# Patient Record
Sex: MALE | Race: WHITE | NOT HISPANIC OR LATINO | Employment: OTHER | URBAN - METROPOLITAN AREA
[De-identification: names, ages, dates, MRNs, and addresses within clinical notes are randomized per-mention and may not be internally consistent; named-entity substitution may affect disease eponyms.]

---

## 2024-10-22 DIAGNOSIS — C92.10 CML (CHRONIC MYELOCYTIC LEUKEMIA): Primary | ICD-10-CM

## 2024-10-31 PROBLEM — C92.11 CHRONIC MYELOID LEUKEMIA, BCR/ABL-POSITIVE, IN REMISSION: Status: ACTIVE | Noted: 2024-10-31

## 2024-11-04 ENCOUNTER — OFFICE VISIT (OUTPATIENT)
Dept: HEMATOLOGY/ONCOLOGY | Facility: CLINIC | Age: 64
End: 2024-11-04
Payer: MEDICARE

## 2024-11-04 VITALS
DIASTOLIC BLOOD PRESSURE: 78 MMHG | TEMPERATURE: 98 F | BODY MASS INDEX: 37.28 KG/M2 | SYSTOLIC BLOOD PRESSURE: 165 MMHG | OXYGEN SATURATION: 99 % | RESPIRATION RATE: 20 BRPM | WEIGHT: 281.31 LBS | HEART RATE: 54 BPM | HEIGHT: 73 IN

## 2024-11-04 DIAGNOSIS — C92.11 CHRONIC MYELOID LEUKEMIA, BCR/ABL-POSITIVE, IN REMISSION: Primary | ICD-10-CM

## 2024-11-04 LAB
ALBUMIN SERPL-MCNC: 3.8 G/DL (ref 3.4–4.8)
ALBUMIN/GLOB SERPL: 1.5 RATIO (ref 1.1–2)
ALP SERPL-CCNC: 52 UNIT/L (ref 40–150)
ALT SERPL-CCNC: 38 UNIT/L (ref 0–55)
ANION GAP SERPL CALC-SCNC: 9 MEQ/L
AST SERPL-CCNC: 42 UNIT/L (ref 5–34)
BASOPHILS # BLD AUTO: 0.03 X10(3)/MCL
BASOPHILS NFR BLD AUTO: 0.4 %
BILIRUB SERPL-MCNC: 1 MG/DL
BUN SERPL-MCNC: 25.2 MG/DL (ref 8.4–25.7)
CALCIUM SERPL-MCNC: 9.2 MG/DL (ref 8.8–10)
CHLORIDE SERPL-SCNC: 105 MMOL/L (ref 98–107)
CO2 SERPL-SCNC: 26 MMOL/L (ref 23–31)
CREAT SERPL-MCNC: 1.51 MG/DL (ref 0.72–1.25)
CREAT/UREA NIT SERPL: 17
EOSINOPHIL # BLD AUTO: 0.07 X10(3)/MCL (ref 0–0.9)
EOSINOPHIL NFR BLD AUTO: 1 %
ERYTHROCYTE [DISTWIDTH] IN BLOOD BY AUTOMATED COUNT: 13.6 % (ref 11.5–17)
GFR SERPLBLD CREATININE-BSD FMLA CKD-EPI: 51 ML/MIN/1.73/M2
GLOBULIN SER-MCNC: 2.6 GM/DL (ref 2.4–3.5)
GLUCOSE SERPL-MCNC: 88 MG/DL (ref 82–115)
HCT VFR BLD AUTO: 37.9 % (ref 42–52)
HGB BLD-MCNC: 12.8 G/DL (ref 14–18)
IMM GRANULOCYTES # BLD AUTO: 0.04 X10(3)/MCL (ref 0–0.04)
IMM GRANULOCYTES NFR BLD AUTO: 0.6 %
LYMPHOCYTES # BLD AUTO: 1.46 X10(3)/MCL (ref 0.6–4.6)
LYMPHOCYTES NFR BLD AUTO: 21.8 %
MCH RBC QN AUTO: 33.7 PG (ref 27–31)
MCHC RBC AUTO-ENTMCNC: 33.8 G/DL (ref 33–36)
MCV RBC AUTO: 99.7 FL (ref 80–94)
MONOCYTES # BLD AUTO: 0.63 X10(3)/MCL (ref 0.1–1.3)
MONOCYTES NFR BLD AUTO: 9.4 %
NEUTROPHILS # BLD AUTO: 4.46 X10(3)/MCL (ref 2.1–9.2)
NEUTROPHILS NFR BLD AUTO: 66.8 %
PLATELET # BLD AUTO: 181 X10(3)/MCL (ref 130–400)
PMV BLD AUTO: 9.4 FL (ref 7.4–10.4)
POTASSIUM SERPL-SCNC: 4.5 MMOL/L (ref 3.5–5.1)
PROT SERPL-MCNC: 6.4 GM/DL (ref 5.8–7.6)
RBC # BLD AUTO: 3.8 X10(6)/MCL (ref 4.7–6.1)
SODIUM SERPL-SCNC: 140 MMOL/L (ref 136–145)
WBC # BLD AUTO: 6.69 X10(3)/MCL (ref 4.5–11.5)

## 2024-11-04 PROCEDURE — 80053 COMPREHEN METABOLIC PANEL: CPT | Performed by: INTERNAL MEDICINE

## 2024-11-04 PROCEDURE — 99214 OFFICE O/P EST MOD 30 MIN: CPT | Mod: PBBFAC | Performed by: INTERNAL MEDICINE

## 2024-11-04 PROCEDURE — 86803 HEPATITIS C AB TEST: CPT | Performed by: INTERNAL MEDICINE

## 2024-11-04 PROCEDURE — 85025 COMPLETE CBC W/AUTO DIFF WBC: CPT | Performed by: INTERNAL MEDICINE

## 2024-11-04 PROCEDURE — 99999 PR PBB SHADOW E&M-EST. PATIENT-LVL IV: CPT | Mod: PBBFAC,,, | Performed by: INTERNAL MEDICINE

## 2024-11-04 PROCEDURE — 36415 COLL VENOUS BLD VENIPUNCTURE: CPT | Performed by: INTERNAL MEDICINE

## 2024-11-04 PROCEDURE — 81207 BCR/ABL1 GENE MINOR BP: CPT | Performed by: INTERNAL MEDICINE

## 2024-11-04 PROCEDURE — 81206 BCR/ABL1 GENE MAJOR BP: CPT | Performed by: INTERNAL MEDICINE

## 2024-11-04 RX ORDER — CYCLOBENZAPRINE HCL 10 MG
10 TABLET ORAL
COMMUNITY
Start: 2024-10-01 | End: 2024-11-04

## 2024-11-04 RX ORDER — HYDROCODONE BITARTRATE AND ACETAMINOPHEN 7.5; 325 MG/1; MG/1
TABLET ORAL
COMMUNITY

## 2024-11-04 RX ORDER — IMATINIB MESYLATE 400 MG/1
400 TABLET, FILM COATED ORAL DAILY
Qty: 90 TABLET | Refills: 1 | Status: ACTIVE | OUTPATIENT
Start: 2024-11-04

## 2024-11-04 RX ORDER — ESCITALOPRAM OXALATE 20 MG/1
1 TABLET, FILM COATED ORAL DAILY
COMMUNITY

## 2024-11-04 RX ORDER — TADALAFIL 5 MG/1
1 TABLET ORAL DAILY
COMMUNITY

## 2024-11-04 RX ORDER — IMATINIB MESYLATE 400 MG/1
1 TABLET, FILM COATED ORAL DAILY
COMMUNITY
End: 2024-11-04 | Stop reason: SDUPTHER

## 2024-11-05 LAB — HCV AB SERPL QL IA: NONREACTIVE

## 2024-11-07 LAB
BCR/ABL1 KINASE DOMAIN MUT ANL BLD/T: NORMAL
GENETICIST REVIEW: NORMAL
M BCR/ABL1 P190 RESULT: NORMAL
PATH REPORT.FINAL DX SPEC: NORMAL
SPECIMEN SOURCE: NORMAL
SPECIMEN SOURCE: NORMAL

## 2024-12-18 PROBLEM — D84.821 DRUG-INDUCED IMMUNODEFICIENCY: Status: ACTIVE | Noted: 2024-12-18

## 2024-12-18 PROBLEM — Z79.899 DRUG-INDUCED IMMUNODEFICIENCY: Status: ACTIVE | Noted: 2024-12-18

## 2024-12-18 PROBLEM — D84.821 DRUG-INDUCED IMMUNODEFICIENCY: Status: RESOLVED | Noted: 2024-12-18 | Resolved: 2024-12-18

## 2024-12-18 PROBLEM — Z79.899 DRUG-INDUCED IMMUNODEFICIENCY: Status: RESOLVED | Noted: 2024-12-18 | Resolved: 2024-12-18

## 2024-12-18 NOTE — PROGRESS NOTES
Subjective:        PATIENT: Tony Adan  MRN: [id number]  DATE: 12/20/2024    PCP: Sridhar Adan MD       Chief Complaint: 6wk f/u    Current Therapy:  Gleevec  Oncology History Overview Note   diagnosis of chronic myeloid leukemia, chronic phase,  Treated at [facility]   with a complete cytogenetic and molecular remission,   currently treated with Gleevec 400 mg daily off protocol.   The  patient has been on Gleevec since his diagnosis in April 2004 and his dose was reduced to 400 mg PO daily  on 11/23/15 due to weight gain and joint pains. His BCR-ABL has been undetectable by PCR since 1/ 2006,     MOVED To [place] 2021  Seen By Dr. Hankins  September 21, 2023 note [facility]:  Patient remains on Gleevec.  PCR from 09/22 MR 4.5     Chronic myeloid leukemia, BCR/ABL-positive, in remission   4/1/2004 Initial Diagnosis    Chronic myeloid leukemia, BCR/ABL-positive,      4/1/2004 Cancer Staged    Staging form: Chronic Myeloid Leukemia, AJCC 8th Edition  - Clinical stage from 4/1/2004: Ph+     4/1/2004 -  Targeted Therapy    4/2004  : imatinib 800 mg   11/23/2015  :  400 mg daily reduced due to wt gain and joint pain      Tumor Markers    His BCR-ABL has been undetectable by PCR since 1/ 2006, ([facility] note)    2/23/2020- No Bcr-abl         11/7/2024 Tumor Markers    Negative. No BCR/ABL1 p210 mRNA transcripts were detected   (%BCR/ABL1(p210):ABL1=0).       Negative. No BCR/ABL1 p190 mRNA transcripts were detected   (%BCR/ABL1(p190):ABL1=0).          12/20/2024  Patient presents for follow up for CML; he currently taking Gleevec 400 mg.  He is in good spirits; denies abnormal bleeding, edema, SOB and s/s of infection.  Reviewed lab and echo results in detail.  Encouraged to drink a lot of water and stay away from NSAIDs.  Patient has sciatic nerve pain; ok to take Tylenol product.  His PCP advised of same and options for pain management.  Next appointment with PCP in 30 days to monitor kidney  function.  Discussed future appointments.    This is a 64-year-old male who was diagnosed with CML many many years ago.  He has been on imatinib and Gleevec.    He continues on 400 mg.  He was last seen by his oncologist in [place].  He was started with an oncologist at [facility] and then transferred in with him to [place].    He denies any fever chills night sweats or weight loss.  He does have some mild edema in his feet.  Denies any PND or orthopnea.  He tolerates his Gleevec well.  He was not up-to-date on his colonoscopy.  He follows his primary care  He was trying to transfer his care here to [place] permanently.      Past Medical History:   Diagnosis Date    Anemia       .  Past Surgical History:   Procedure Laterality Date    APPENDECTOMY      BACK SURGERY  2002    BONE MARROW ASPIRATION      COLONOSCOPY  01/01/2011    EGD, WITH BALLOON DILATION      TONSILLECTOMY        .  Family History   Problem Relation Name Age of Onset    Dementia Mother      Other (gun shot) Father        Social History     Socioeconomic History    Marital status:    Tobacco Use    Smoking status: Never    Smokeless tobacco: Never   Substance and Sexual Activity    Alcohol use: Yes    Drug use: Never      .Review of patient's allergies indicates:  No Known Allergies     Current Outpatient Medications:     HYDROcodone-acetaminophen (NORCO) 7.5-325 mg per tablet, , Disp: , Rfl:     imatinib (GLEEVEC) 400 MG Tab, Take 1 tablet (400 mg total) by mouth once daily., Disp: 90 tablet, Rfl: 1    LEXAPRO 20 mg tablet, Take 1 tablet by mouth once daily., Disp: , Rfl:     tadalafiL (CIALIS) 5 MG tablet, Take 1 tablet by mouth once daily., Disp: , Rfl:    Review of Systems     Pertinent positives above in HPI  Objective:     Vitals:    12/20/24 0850   BP: (!) 144/74   Pulse: 60   Resp: 20   Temp: 97.4 °F (36.3 °C)           Physical Exam  Vitals reviewed.   Constitutional:       Appearance: Normal appearance.   HENT:      Head:  Normocephalic and atraumatic.      Right Ear: Tympanic membrane normal.      Left Ear: Tympanic membrane normal.      Mouth/Throat:      Mouth: Mucous membranes are moist.      Pharynx: Oropharynx is clear.   Eyes:      Extraocular Movements: Extraocular movements intact.      Conjunctiva/sclera: Conjunctivae normal.      Pupils: Pupils are equal, round, and reactive to light.   Cardiovascular:      Rate and Rhythm: Normal rate and regular rhythm.      Pulses: Normal pulses.      Heart sounds: Normal heart sounds.   Pulmonary:      Effort: Pulmonary effort is normal.      Breath sounds: Normal breath sounds.   Abdominal:      General: Abdomen is flat. Bowel sounds are normal. There is no distension.      Palpations: Abdomen is soft.   Musculoskeletal:         General: Normal range of motion.      Cervical back: Normal range of motion and neck supple.      Right lower leg: Edema present.      Left lower leg: Edema present.   Skin:     General: Skin is warm and dry.   Neurological:      General: No focal deficit present.      Mental Status: He is alert and oriented to person, place, and time. Mental status is at baseline.   Psychiatric:         Attention and Perception: Attention normal.         Mood and Affect: Mood normal.         Speech: Speech normal.         Behavior: Behavior normal.         Thought Content: Thought content normal.         Judgment: Judgment normal.         ECOG SCORE            .Lab Review:  Lab Review:  Results for orders placed or performed in visit on 11/04/24   Comprehensive Metabolic Panel    Collection Time: 11/04/24  3:03 PM   Result Value Ref Range    Sodium 140 136 - 145 mmol/L    Potassium 4.5 3.5 - 5.1 mmol/L    Chloride 105 98 - 107 mmol/L    CO2 26 23 - 31 mmol/L    Glucose 88 82 - 115 mg/dL    Blood Urea Nitrogen 25.2 8.4 - 25.7 mg/dL    Creatinine 1.51 (H) 0.72 - 1.25 mg/dL    Calcium 9.2 8.8 - 10.0 mg/dL    Protein Total 6.4 5.8 - 7.6 gm/dL    Albumin 3.8 3.4 - 4.8 g/dL     Globulin 2.6 2.4 - 3.5 gm/dL    Albumin/Globulin Ratio 1.5 1.1 - 2.0 ratio    Bilirubin Total 1.0 <=1.5 mg/dL    ALP 52 40 - 150 unit/L    ALT 38 0 - 55 unit/L    AST 42 (H) 5 - 34 unit/L    eGFR 51 mL/min/1.73/m2    Anion Gap 9.0 mEq/L    BUN/Creatinine Ratio 17    BCR/ABL, P210, MONITOR    Collection Time: 11/04/24  3:03 PM   Result Value Ref Range    BCR/ABL1, p210 Result see interpretation     Specimen Type Blood     Final Diagnosis SEE COMMENTS    BCR/ABL, p190, MONITOR    Collection Time: 11/04/24  3:03 PM   Result Value Ref Range    BCR/ABL1 p190 Result see interpretation     Specimen Type Blood     Interpretation SEE COMMENTS    Hepatitis C Antibody    Collection Time: 11/04/24  3:03 PM   Result Value Ref Range    Hep C Ab Interp Nonreactive Nonreactive   CBC with Differential    Collection Time: 11/04/24  3:03 PM   Result Value Ref Range    WBC 6.69 4.50 - 11.50 x10(3)/mcL    RBC 3.80 (L) 4.70 - 6.10 x10(6)/mcL    Hgb 12.8 (L) 14.0 - 18.0 g/dL    Hct 37.9 (L) 42.0 - 52.0 %    MCV 99.7 (H) 80.0 - 94.0 fL    MCH 33.7 (H) 27.0 - 31.0 pg    MCHC 33.8 33.0 - 36.0 g/dL    RDW 13.6 11.5 - 17.0 %    Platelet 181 130 - 400 x10(3)/mcL    MPV 9.4 7.4 - 10.4 fL    Neut % 66.8 %    Lymph % 21.8 %    Mono % 9.4 %    Eos % 1.0 %    Basophil % 0.4 %    Lymph # 1.46 0.6 - 4.6 x10(3)/mcL    Neut # 4.46 2.1 - 9.2 x10(3)/mcL    Mono # 0.63 0.1 - 1.3 x10(3)/mcL    Eos # 0.07 0 - 0.9 x10(3)/mcL    Baso # 0.03 <=0.2 x10(3)/mcL    IG# 0.04 0 - 0.04 x10(3)/mcL    IG% 0.6 %        Echocardiogram 11/15/2024: Normal EF, normal left ventricular size, EF 60 65%, no diastolic dysfunction, normal wall thickness      Assessment/Plan:   Longstanding Gleevec use   CML chronic phase apparently in complete remission   History of sleep apnea   History of hypertension  Elevated BMI  Elevated creatinine    Discussion this is a 64-year-old gentleman longstanding CML apparently in remission.    We discussed repeating his lab work today, we  discussed echocardiogram.    We discussed NCCN guidelines  We discussed potential removal of Gleevec.  However he has been on quite some time.  he was quite comfortable staying on his medication.    We discussed imatinib resistance, and relapses of  CML.  Was getting from [facility] in past  [facility]    Plan  Gleevec 400 mg   Return to clinic 6 months - CBC and P210  Encourage exercise, weight loss and hydration  Sciatic pain - Avoid NSAID's - pain management via PCP he reports his primary care discussed his chronic kidney disease as well  PCP for colorectal cancer screening, age-appropriate cancer screening, vaccination protocol     Follow up in about 6 months (around 6/20/2025) for Labs CBC/P210 and OV.   Orders Placed This Encounter    CBC Auto Differential    BCR/ABL, P210, MONITOR     I, Joanne Hong LPN, acted solely as a scribe for and in the presence of, Dr. Eron Gabriel who performed the service.     Eron Gabriel MD

## 2024-12-20 ENCOUNTER — OFFICE VISIT (OUTPATIENT)
Dept: HEMATOLOGY/ONCOLOGY | Facility: CLINIC | Age: 64
End: 2024-12-20
Payer: MEDICARE

## 2024-12-20 VITALS
HEIGHT: 73 IN | BODY MASS INDEX: 37.44 KG/M2 | WEIGHT: 282.5 LBS | HEART RATE: 60 BPM | RESPIRATION RATE: 20 BRPM | OXYGEN SATURATION: 100 % | DIASTOLIC BLOOD PRESSURE: 74 MMHG | TEMPERATURE: 97 F | SYSTOLIC BLOOD PRESSURE: 144 MMHG

## 2024-12-20 DIAGNOSIS — C92.11 CHRONIC MYELOID LEUKEMIA, BCR/ABL-POSITIVE, IN REMISSION: Primary | ICD-10-CM

## 2024-12-20 PROCEDURE — 99214 OFFICE O/P EST MOD 30 MIN: CPT | Mod: PBBFAC | Performed by: INTERNAL MEDICINE

## 2024-12-20 PROCEDURE — 99999 PR PBB SHADOW E&M-EST. PATIENT-LVL IV: CPT | Mod: PBBFAC,,, | Performed by: INTERNAL MEDICINE

## 2025-07-11 ENCOUNTER — OFFICE VISIT (OUTPATIENT)
Dept: HEMATOLOGY/ONCOLOGY | Facility: CLINIC | Age: 65
End: 2025-07-11
Payer: MEDICARE

## 2025-07-11 ENCOUNTER — LAB VISIT (OUTPATIENT)
Dept: LAB | Facility: HOSPITAL | Age: 65
End: 2025-07-11
Attending: INTERNAL MEDICINE
Payer: MEDICARE

## 2025-07-11 VITALS
RESPIRATION RATE: 18 BRPM | HEIGHT: 73 IN | TEMPERATURE: 98 F | SYSTOLIC BLOOD PRESSURE: 139 MMHG | BODY MASS INDEX: 33.93 KG/M2 | DIASTOLIC BLOOD PRESSURE: 79 MMHG | OXYGEN SATURATION: 98 % | WEIGHT: 256 LBS | HEART RATE: 64 BPM

## 2025-07-11 DIAGNOSIS — C92.11 CHRONIC MYELOID LEUKEMIA, BCR/ABL-POSITIVE, IN REMISSION: Primary | ICD-10-CM

## 2025-07-11 DIAGNOSIS — C92.11 CHRONIC MYELOID LEUKEMIA, BCR/ABL-POSITIVE, IN REMISSION: ICD-10-CM

## 2025-07-11 DIAGNOSIS — Z79.899 ENCOUNTER FOR LONG-TERM (CURRENT) USE OF HIGH-RISK MEDICATION: ICD-10-CM

## 2025-07-11 LAB
BASOPHILS # BLD AUTO: 0.03 X10(3)/MCL
BASOPHILS NFR BLD AUTO: 0.5 %
EOSINOPHIL # BLD AUTO: 0.2 X10(3)/MCL (ref 0–0.9)
EOSINOPHIL NFR BLD AUTO: 3.1 %
ERYTHROCYTE [DISTWIDTH] IN BLOOD BY AUTOMATED COUNT: 13.1 % (ref 11.5–17)
HCT VFR BLD AUTO: 38.1 % (ref 42–52)
HGB BLD-MCNC: 12.6 G/DL (ref 14–18)
IMM GRANULOCYTES # BLD AUTO: 0.03 X10(3)/MCL (ref 0–0.04)
IMM GRANULOCYTES NFR BLD AUTO: 0.5 %
LYMPHOCYTES # BLD AUTO: 1.45 X10(3)/MCL (ref 0.6–4.6)
LYMPHOCYTES NFR BLD AUTO: 22.6 %
MCH RBC QN AUTO: 33.1 PG (ref 27–31)
MCHC RBC AUTO-ENTMCNC: 33.1 G/DL (ref 33–36)
MCV RBC AUTO: 100 FL (ref 80–94)
MONOCYTES # BLD AUTO: 0.68 X10(3)/MCL (ref 0.1–1.3)
MONOCYTES NFR BLD AUTO: 10.6 %
NEUTROPHILS # BLD AUTO: 4.02 X10(3)/MCL (ref 2.1–9.2)
NEUTROPHILS NFR BLD AUTO: 62.7 %
PLATELET # BLD AUTO: 183 X10(3)/MCL (ref 130–400)
PMV BLD AUTO: 9.7 FL (ref 7.4–10.4)
RBC # BLD AUTO: 3.81 X10(6)/MCL (ref 4.7–6.1)
WBC # BLD AUTO: 6.41 X10(3)/MCL (ref 4.5–11.5)

## 2025-07-11 PROCEDURE — 99213 OFFICE O/P EST LOW 20 MIN: CPT | Mod: PBBFAC | Performed by: NURSE PRACTITIONER

## 2025-07-11 PROCEDURE — 85025 COMPLETE CBC W/AUTO DIFF WBC: CPT

## 2025-07-11 PROCEDURE — 99999 PR PBB SHADOW E&M-EST. PATIENT-LVL III: CPT | Mod: PBBFAC,,, | Performed by: NURSE PRACTITIONER

## 2025-07-11 RX ORDER — IMATINIB MESYLATE 400 MG/1
400 TABLET, FILM COATED ORAL DAILY
Qty: 90 TABLET | Refills: 1 | Status: ACTIVE | OUTPATIENT
Start: 2025-07-11

## 2025-07-11 NOTE — PROGRESS NOTES
Subjective:        PATIENT: Tony Adan  MRN: [id number]  DATE: 7/11/2025    PCP: Sridhar Adan MD       Chief Complaint: 6mo f/u    Current Therapy:  Gleevec  Oncology History Overview Note   Diagnosis of chronic myeloid leukemia, chronic phase, treated at [facility].  He has a complete cytogenetic and molecular remission, currently treated with Gleevec 400 mg daily off protocol.  The  patient has been on Gleevec since his diagnosis in April 2004 and his dose was reduced to 400 mg PO daily on 11/23/15 due to weight gain and joint pains. His BCR-ABL has been undetectable by PCR since 1/2006.    His Oncologist moved to [place] 2021 and continued to see him in [place].   Seen By Dr. Hankins     Chronic myeloid leukemia, BCR/ABL-positive, in remission   4/1/2004 Initial Diagnosis    Chronic myeloid leukemia, BCR/ABL-positive,      4/1/2004 Cancer Staged    Staging form: Chronic Myeloid Leukemia, AJCC 8th Edition  - Clinical stage from 4/1/2004: Ph+     4/1/2004 -  Targeted Therapy    4/2004  : imatinib 800 mg   11/23/2015  :  400 mg daily reduced due to wt gain and joint pain      Tumor Markers    His BCR-ABL has been undetectable by PCR since 1/ 2006, ([facility] note)    2/23/2020- No Bcr-abl         11/7/2024 Tumor Markers    Negative. No BCR/ABL1 p210 mRNA transcripts were detected   (%BCR/ABL1(p210):ABL1=0).       Negative. No BCR/ABL1 p190 mRNA transcripts were detected   (%BCR/ABL1(p190):ABL1=0).          07/11/2025  Patient presents for 6m follow up for CML accompanied by his wife.  He is currently taking Gleevec 400 mg. He has been out of the medicine for 2 weeks.   He tolerates the medication well other than some joint pains. He denies abnormal bleeding, edema, SOB and s/s of infection.      Past Medical History:   Diagnosis Date    Anemia       .  Past Surgical History:   Procedure Laterality Date    APPENDECTOMY      BACK SURGERY  2002    BONE MARROW ASPIRATION      COLONOSCOPY  01/01/2011    EGD,  WITH BALLOON DILATION      TONSILLECTOMY        .  Family History   Problem Relation Name Age of Onset    Dementia Mother      Other (gun shot) Father        Social History     Socioeconomic History    Marital status:    Tobacco Use    Smoking status: Never    Smokeless tobacco: Never   Substance and Sexual Activity    Alcohol use: Yes    Drug use: Never      .Review of patient's allergies indicates:  No Known Allergies     Current Outpatient Medications:     HYDROcodone-acetaminophen (NORCO) 7.5-325 mg per tablet, , Disp: , Rfl:     LEXAPRO 20 mg tablet, Take 1 tablet by mouth once daily., Disp: , Rfl:     tadalafiL (CIALIS) 5 MG tablet, Take 1 tablet by mouth once daily., Disp: , Rfl:     imatinib (GLEEVEC) 400 MG Tab, Take 1 tablet (400 mg total) by mouth once daily., Disp: 90 tablet, Rfl: 1   Review of Systems   Constitutional:  Negative for appetite change and unexpected weight change.   HENT:  Negative for mouth sores.    Eyes:  Positive for visual disturbance.   Respiratory:  Negative for cough and shortness of breath.    Cardiovascular:  Negative for chest pain.   Gastrointestinal:  Negative for abdominal pain and diarrhea.   Genitourinary:  Negative for frequency.   Musculoskeletal:  Positive for back pain.   Skin:  Negative for rash.   Neurological:  Negative for headaches.   Hematological:  Negative for adenopathy.   Psychiatric/Behavioral:  The patient is not nervous/anxious.         Pertinent positives above in HPI  Objective:     Vitals:    07/11/25 1119   BP: 139/79   Pulse: 64   Resp: 18   Temp: 97.9 °F (36.6 °C)           Physical Exam  Vitals reviewed.   Constitutional:       Appearance: Normal appearance.   HENT:      Head: Normocephalic and atraumatic.      Right Ear: Tympanic membrane normal.      Left Ear: Tympanic membrane normal.      Mouth/Throat:      Mouth: Mucous membranes are moist.      Pharynx: Oropharynx is clear.   Eyes:      Extraocular Movements: Extraocular movements  intact.      Conjunctiva/sclera: Conjunctivae normal.      Pupils: Pupils are equal, round, and reactive to light.   Cardiovascular:      Rate and Rhythm: Normal rate and regular rhythm.      Pulses: Normal pulses.      Heart sounds: Normal heart sounds.   Pulmonary:      Effort: Pulmonary effort is normal.      Breath sounds: Normal breath sounds.   Abdominal:      General: Abdomen is flat. Bowel sounds are normal. There is no distension.      Palpations: Abdomen is soft.   Musculoskeletal:         General: Normal range of motion.      Cervical back: Normal range of motion and neck supple.      Right lower leg: Edema present.      Left lower leg: Edema present.   Skin:     General: Skin is warm and dry.   Neurological:      General: No focal deficit present.      Mental Status: He is alert and oriented to person, place, and time. Mental status is at baseline.   Psychiatric:         Attention and Perception: Attention normal.         Mood and Affect: Mood normal.         Speech: Speech normal.         Behavior: Behavior normal.         Thought Content: Thought content normal.         Judgment: Judgment normal.         ECOG SCORE            .Lab Review:  Lab Review:  Results for orders placed or performed in visit on 07/11/25   CBC with Differential    Collection Time: 07/11/25 11:08 AM   Result Value Ref Range    WBC 6.41 4.50 - 11.50 x10(3)/mcL    RBC 3.81 (L) 4.70 - 6.10 x10(6)/mcL    Hgb 12.6 (L) 14.0 - 18.0 g/dL    Hct 38.1 (L) 42.0 - 52.0 %    .0 (H) 80.0 - 94.0 fL    MCH 33.1 (H) 27.0 - 31.0 pg    MCHC 33.1 33.0 - 36.0 g/dL    RDW 13.1 11.5 - 17.0 %    Platelet 183 130 - 400 x10(3)/mcL    MPV 9.7 7.4 - 10.4 fL    Neut % 62.7 %    Lymph % 22.6 %    Mono % 10.6 %    Eos % 3.1 %    Basophil % 0.5 %    Imm Grans % 0.5 %    Neut # 4.02 2.1 - 9.2 x10(3)/mcL    Lymph # 1.45 0.6 - 4.6 x10(3)/mcL    Mono # 0.68 0.1 - 1.3 x10(3)/mcL    Eos # 0.20 0 - 0.9 x10(3)/mcL    Baso # 0.03 <=0.2 x10(3)/mcL    Imm Gran #  0.03 0.00 - 0.04 x10(3)/mcL        Echocardiogram 11/15/2024: Normal EF, normal left ventricular size, EF 60 65%, no diastolic dysfunction, normal wall thickness      Assessment/Plan:   Longstanding Gleevec use   CML chronic phase apparently in complete remission     Briefly discussed discontinuation criteria at some point  Plan  Treatment:   Continue Gleevec 400 mg (reduced dose). Obtained from [facility]  CBC and P210 1 week prior at [facility]      Follow up in about 6 months (around 1/11/2026) for OV with labs 1 week prior at [facility].   Orders Placed This Encounter    imatinib (GLEEVEC) 400 MG Tab

## 2025-07-11 NOTE — Clinical Note
Please fax order for CBC and BCR-ABL p210 to [facility] to be done in 6 months, 1 week prior to his appointment

## 2025-07-15 LAB
BCR/ABL1 KINASE DOMAIN MUT ANL BLD/T: NORMAL
PATH REPORT.FINAL DX SPEC: NORMAL
SPECIMEN SOURCE: NORMAL

## 2025-07-16 ENCOUNTER — RESULTS FOLLOW-UP (OUTPATIENT)
Dept: HEMATOLOGY/ONCOLOGY | Facility: CLINIC | Age: 65
End: 2025-07-16
Payer: MEDICARE

## 2025-07-16 ENCOUNTER — TELEPHONE (OUTPATIENT)
Dept: HEMATOLOGY/ONCOLOGY | Facility: CLINIC | Age: 65
End: 2025-07-16
Payer: MEDICARE

## 2025-07-16 NOTE — TELEPHONE ENCOUNTER
----- Message from PALMIRA Maguire sent at 7/16/2025 11:16 AM CDT -----  Please let Mr Allan know his BCR-ABL was negative for CML  ----- Message -----  From: Eron Gabriel MD  Sent: 7/15/2025   6:07 PM CDT  To: PALMIRA Torres      ----- Message -----  From: Lab, Background User  Sent: 7/11/2025  11:15 AM CDT  To: Eron Gabriel MD